# Patient Record
Sex: MALE | Race: WHITE | NOT HISPANIC OR LATINO | Employment: FULL TIME | URBAN - METROPOLITAN AREA
[De-identification: names, ages, dates, MRNs, and addresses within clinical notes are randomized per-mention and may not be internally consistent; named-entity substitution may affect disease eponyms.]

---

## 2020-12-09 ENCOUNTER — OFFICE VISIT (OUTPATIENT)
Dept: URGENT CARE | Facility: CLINIC | Age: 30
End: 2020-12-09
Payer: COMMERCIAL

## 2020-12-09 VITALS
SYSTOLIC BLOOD PRESSURE: 131 MMHG | TEMPERATURE: 98 F | HEART RATE: 62 BPM | HEIGHT: 72 IN | OXYGEN SATURATION: 96 % | WEIGHT: 185 LBS | RESPIRATION RATE: 15 BRPM | DIASTOLIC BLOOD PRESSURE: 75 MMHG | BODY MASS INDEX: 25.06 KG/M2

## 2020-12-09 DIAGNOSIS — U07.1 COVID-19 VIRUS DETECTED: ICD-10-CM

## 2020-12-09 DIAGNOSIS — U07.1 COVID-19: Primary | ICD-10-CM

## 2020-12-09 LAB
CTP QC/QA: YES
SARS-COV-2 RDRP RESP QL NAA+PROBE: POSITIVE

## 2020-12-09 PROCEDURE — 99202 OFFICE O/P NEW SF 15 MIN: CPT | Mod: S$GLB,,, | Performed by: STUDENT IN AN ORGANIZED HEALTH CARE EDUCATION/TRAINING PROGRAM

## 2020-12-09 PROCEDURE — 99202 PR OFFICE/OUTPT VISIT, NEW, LEVL II, 15-29 MIN: ICD-10-PCS | Mod: S$GLB,,, | Performed by: STUDENT IN AN ORGANIZED HEALTH CARE EDUCATION/TRAINING PROGRAM

## 2020-12-09 PROCEDURE — U0002 COVID-19 LAB TEST NON-CDC: HCPCS | Mod: QW,S$GLB,, | Performed by: STUDENT IN AN ORGANIZED HEALTH CARE EDUCATION/TRAINING PROGRAM

## 2020-12-09 PROCEDURE — 3008F BODY MASS INDEX DOCD: CPT | Mod: CPTII,S$GLB,, | Performed by: STUDENT IN AN ORGANIZED HEALTH CARE EDUCATION/TRAINING PROGRAM

## 2020-12-09 PROCEDURE — 3008F PR BODY MASS INDEX (BMI) DOCUMENTED: ICD-10-PCS | Mod: CPTII,S$GLB,, | Performed by: STUDENT IN AN ORGANIZED HEALTH CARE EDUCATION/TRAINING PROGRAM

## 2020-12-09 PROCEDURE — U0002: ICD-10-PCS | Mod: QW,S$GLB,, | Performed by: STUDENT IN AN ORGANIZED HEALTH CARE EDUCATION/TRAINING PROGRAM

## 2020-12-09 NOTE — PATIENT INSTRUCTIONS
Please isolate yourself at home.  You may leave home and/or return to work once the following conditions are met:    If you were not hospitalized and are not severely immunocompromised*:   More than 10 days since symptoms first appeared AND   More than 24 hours fever free without medications AND   Symptoms have improved     If you were hospitalized OR are severely immunocompromised*:   More than 20 days since symptoms first appeared   More than 24 hours fever free without medications   Symptoms have improved    If you had no symptoms but tested positive:   More than 10 days since the date of the first positive test (20 days if severely immunocompromised).   If you develop symptoms, then use the guidelines above.     *Definition of severely immunocompromised:  - Current chemotherapy for cancer  - Untreated HIV with CD4 count less than 200  - Combined primary immunodeficiency disorder  - Prednisone more than 20 mg per day for more than 14 days  - Post-transplant patients    Additional instructions:   Separate yourself from other people and animals in your home.   Call ahead before visiting your doctor.   Wear a facemask when around others.   Cover your coughs and sneezes.   Wash your hands often with soap and water; hand  can be used, too.   Avoid sharing personal household items.   Wipe down surfaces used daily.   Monitor your symptoms. Seek prompt medical attention if your illness is worsening (e.g., difficulty breathing).    Before seeking care, call your healthcare provider.   If you have a medical emergency and need to call 911, notify the dispatch personnel that you have, or are being evaluated for COVID-19. If possible, put on a facemask before emergency medical services arrive.        Contact Tracing    As one of the next steps, you will receive a call or text from the [place] Department of Health ([facility]) COVID-19 Tracing Team. See the contact information below so you know not to  ignore the health departments call. It is important that you contact them back immediately so they can help.      Contact Tracer Number:  [phone]  Caller ID for most carriers: [place] Dept Health     What is contact tracing?  · Contact tracing is a process that helps identify everyone who has been in close contact with an infected person. Contact tracers let those people know they may have been exposed and guide them on next steps. Confidentiality is important for everyone; no one will be told who may have exposed them to the virus.  · Your involvement is important. The more we know about where and how this virus is spreading, the better chance we have at stopping it from spreading further.  What does exposure mean?  · Exposure means you have been within 6 feet for more than 15 minutes with a person who has or had COVID-19.  What kind of questions do the contact tracers ask?  · A contact tracer will confirm your basic contact information including name, address, phone number, and next of kin, as well as asking about any symptoms you may have had. Theyll also ask you how you think you may have gotten sick, such as places where you may have been exposed to the virus, and people you were with. Those names will never be shared with anyone outside of that call, and will only be used to help trace and stop the spread of the virus.   I have privacy concerns. How will the state use my information?  · Your privacy about your health is important. All calls are completed using call centers that use the appropriate health privacy protection measures (HIPAA compliance), meaning that your patient information is safe. No one will ever ask you any questions related to immigration status. Your health comes first.   Do I have to participate?  · You do not have to participate, but we strongly encourage you to. Contact tracing can help us catch and control new outbreaks as theyre developing to keep your friends and family safe.    What if I dont hear from anyone?  · If you dont receive a call within 24 hours, you can call the number above right away to inquire about your status. That line is open from 8:00 am - 8:00 p.m., 7 days a week.  Contact tracing saves lives! Together, we have the power to beat this virus and keep our loved ones and neighbors safe.    For more information see CDC link below.      https://www.cdc.gov/coronavirus/2019-ncov/hcp/guidance-prevent-spread.html#precautions        Sources:  CDC, [place] Department [facility] and [facility]

## 2020-12-09 NOTE — PROGRESS NOTES
Subjective:       Patient ID: Evan Sotelo is a 30 y.o. male.    Vitals:  height is 6' (1.829 m) and weight is 83.9 kg (185 lb). His temperature is 98.3 °F (36.8 °C). His blood pressure is 131/75 and his pulse is 62. His respiration is 15 and oxygen saturation is 96%.     Chief Complaint: COVID-19 Concerns    This is a 30 y.o. male who presents today with a chief complaint of   Sore throat, sneezing, congestion, chills, and headaches. Pt is a . Sx started yesterday. Denied fever or sx's of respiratory distress.    Sore Throat   This is a new problem. The current episode started in the past 7 days. The problem has been gradually worsening. Neither side of throat is experiencing more pain than the other. There has been no fever. The patient is experiencing no pain. Associated symptoms include congestion, coughing and headaches. Pertinent negatives include no abdominal pain, diarrhea, ear discharge, ear pain, plugged ear sensation, neck pain, shortness of breath, stridor, swollen glands, trouble swallowing or vomiting. He has had no exposure to strep or mono. He has tried nothing for the symptoms. The treatment provided no relief.       Constitution: Positive for appetite change and chills. Negative for sweating, fatigue and fever.   HENT: Positive for congestion and sore throat. Negative for ear pain, ear discharge, sinus pain, sinus pressure, trouble swallowing and voice change.         Anosmia   Neck: Negative for neck pain and painful lymph nodes.   Cardiovascular: Negative for chest pain, palpitations and sob on exertion.   Eyes: Negative for eye discharge, eye itching and eye redness.   Respiratory: Positive for cough. Negative for chest tightness, sputum production, bloody sputum, COPD, shortness of breath, stridor, wheezing and asthma.    Gastrointestinal: Positive for nausea. Negative for abdominal pain, vomiting and diarrhea.   Musculoskeletal: Positive for muscle ache. Negative for joint  pain and joint swelling.   Skin: Negative for color change, rash and lesion.   Allergic/Immunologic: Positive for sneezing. Negative for seasonal allergies and asthma.   Neurological: Positive for headaches. Negative for dizziness and light-headedness.   Hematologic/Lymphatic: Negative for swollen lymph nodes.   Psychiatric/Behavioral: Negative for confusion, agitation and sleep disturbance.       Objective:      Physical Exam   Constitutional: He appears well-developed. He does not appear ill. No distress.   HENT:   Head: Normocephalic and atraumatic.   Ears:   Right Ear: External ear normal.   Left Ear: External ear normal.   Eyes: Conjunctivae and EOM are normal. Right eye exhibits no discharge. Left eye exhibits no discharge.   Cardiovascular: Normal rate, regular rhythm and normal heart sounds.   Pulmonary/Chest: Effort normal and breath sounds normal. No respiratory distress.   Neurological: He is alert.   Vitals reviewed.    Recent Lab Results       12/09/20  1407         Acceptable Yes     SARS-CoV-2 RNA, Amplification, Qual Positive             Assessment:       1. COVID-19        Plan:         COVID-19  -     POCT COVID-19 Rapid Screening  - Reviewed positive COVID results with patient. Counseled on home isolation per CDC guidelines. Discussed home isolation and monitoring for close contacts per CDC definition. Return precautions for worsening symptoms given. Questions answered and pt expressed understanding.  - counseled on home care and OTC medications    Follow up if symptoms worsen or fail to improve.    Kendall Moss MD/MPH  [facility]  [facility]

## 2020-12-09 NOTE — LETTER
[address]  [address] ? [place], [address] ? Phone [phone] ? Fax [phone]           Return to Work/School    Patient: Evan Sotelo  YOB: 1990   Date: 12/09/2020      To Whom It May Concern:     Evan Sotelo was in contact with/seen in my office on 12/09/2020. COVID-19 is present in our communities across the [facility]. Not all patients are eligible or appropriate to be tested. In this situation, your employee meets the following criteria:     Evan Sotelo has met the criteria for COVID-19 testing and has a POSITIVE result. He can return to work one his symptoms improve and he is without fever for 24 hours without the use of fever reducing medications AND at least ten days from the start of symptoms (12/18/2020 at the earliest).      If you have any questions or concerns, or if I can be of further assistance, please do not hesitate to contact me.     Sincerely,    Kendall Moss MD

## 2024-07-18 ENCOUNTER — OFFICE VISIT (OUTPATIENT)
Dept: URGENT CARE | Facility: CLINIC | Age: 34
End: 2024-07-18
Payer: COMMERCIAL

## 2024-07-18 VITALS
SYSTOLIC BLOOD PRESSURE: 129 MMHG | HEART RATE: 69 BPM | WEIGHT: 185 LBS | DIASTOLIC BLOOD PRESSURE: 73 MMHG | HEIGHT: 72 IN | BODY MASS INDEX: 25.06 KG/M2 | OXYGEN SATURATION: 98 % | TEMPERATURE: 98 F | RESPIRATION RATE: 18 BRPM

## 2024-07-18 DIAGNOSIS — T63.481A INSECT STINGS, ACCIDENTAL OR UNINTENTIONAL, INITIAL ENCOUNTER: Primary | ICD-10-CM

## 2024-07-18 PROCEDURE — 99203 OFFICE O/P NEW LOW 30 MIN: CPT | Mod: 25,S$GLB,, | Performed by: NURSE PRACTITIONER

## 2024-07-18 PROCEDURE — 96372 THER/PROPH/DIAG INJ SC/IM: CPT | Mod: S$GLB,,, | Performed by: NURSE PRACTITIONER

## 2024-07-18 RX ORDER — HYDROXYZINE PAMOATE 25 MG/1
25 CAPSULE ORAL EVERY 8 HOURS PRN
Qty: 21 CAPSULE | Refills: 0 | Status: SHIPPED | OUTPATIENT
Start: 2024-07-18

## 2024-07-18 RX ORDER — DEXAMETHASONE SODIUM PHOSPHATE 100 MG/10ML
10 INJECTION INTRAMUSCULAR; INTRAVENOUS
Status: COMPLETED | OUTPATIENT
Start: 2024-07-18 | End: 2024-07-18

## 2024-07-18 RX ORDER — TRIAMCINOLONE ACETONIDE 1 MG/G
OINTMENT TOPICAL 2 TIMES DAILY
Qty: 30 G | Refills: 0 | Status: SHIPPED | OUTPATIENT
Start: 2024-07-18 | End: 2024-07-25

## 2024-07-18 RX ADMIN — DEXAMETHASONE SODIUM PHOSPHATE 10 MG: 100 INJECTION INTRAMUSCULAR; INTRAVENOUS at 10:07

## 2024-07-18 NOTE — PATIENT INSTRUCTIONS
- You must understand that you have received an Urgent Care treatment only and that you may be released before all of your medical problems are known or treated.   - You, the patient, will arrange for follow up care as instructed.   - If your condition worsens or fails to improve we recommend that you receive another evaluation at the ER immediately or contact your PCP to discuss your concerns.   - You can call [phone] or [phone] to help schedule an appointment with the appropriate provider.    Keep area clean and dry   Clean the area with soap and water and apply antibiotic ointment twice a day  Monitor the area for signs of infection: redness, swelling, increasing pain, yellow drainage, warmth  Apply ice for about 15 minutes, do not place directly on skin. Do this 3-4 times per day

## 2024-07-18 NOTE — PROGRESS NOTES
Subjective:      Patient ID: Evan Sotelo is a 33 y.o. male.    Vitals:  height is 6' (1.829 m) and weight is 83.9 kg (185 lb). His oral temperature is 97.9 °F (36.6 °C). His blood pressure is 129/73 and his pulse is 69. His respiration is 18 and oxygen saturation is 98%.     Chief Complaint: Insect Bite    Pt is a 33 y.o. male with the complaint of right arm swelling after wasp sting. Symptoms began 3 days ago and swelling began the following day. He has been putting ice on the sting. This morning it has worsened, swollen from wrist to shoulder.     Insect Bite  This is a new problem. The current episode started in the past 7 days. The problem has been gradually worsening. Associated symptoms include numbness. Pertinent negatives include no abdominal pain, anorexia, arthralgias, change in bowel habit, chest pain, chills, congestion, coughing, diaphoresis, fatigue, fever, headaches, joint swelling, myalgias, nausea, neck pain, rash, sore throat, swollen glands, urinary symptoms, vertigo, visual change, vomiting or weakness. Nothing aggravates the symptoms. He has tried nothing for the symptoms.       Constitution: Negative for chills, sweating, fatigue and fever.   HENT:  Negative for congestion and sore throat.    Neck: Negative for neck pain.   Cardiovascular:  Negative for chest pain.   Respiratory:  Negative for cough.    Gastrointestinal:  Negative for abdominal pain, nausea and vomiting.   Musculoskeletal:  Negative for joint pain, joint swelling and muscle ache.   Skin:  Negative for rash.   Neurological:  Positive for numbness. Negative for history of vertigo and headaches.      Objective:     Physical Exam   Constitutional: He is oriented to person, place, and time.   HENT:   Head: Normocephalic.   Ears:   Right Ear: External ear normal.   Left Ear: External ear normal.   Nose: Nose normal.   Mouth/Throat: Mucous membranes are moist.   Eyes: Conjunctivae are normal.   Cardiovascular: Normal rate.    Pulmonary/Chest: Effort normal.   Musculoskeletal: Normal range of motion.         General: Normal range of motion.   Neurological: He is alert and oriented to person, place, and time.   Skin: Skin is dry.        Psychiatric: His behavior is normal.   Nursing note and vitals reviewed.      Assessment:     1. Insect stings, accidental or unintentional, initial encounter        Plan:       Insect stings, accidental or unintentional, initial encounter  -     dexAMETHasone injection 10 mg  -     hydrOXYzine pamoate (VISTARIL) 25 MG Cap; Take 1 capsule (25 mg total) by mouth every 8 (eight) hours as needed (itching).  Dispense: 21 capsule; Refill: 0  -     triamcinolone acetonide 0.1% (KENALOG) 0.1 % ointment; Apply topically 2 (two) times daily. for 7 days  Dispense: 30 g; Refill: 0      Patient Instructions   - You must understand that you have received an Urgent Care treatment only and that you may be released before all of your medical problems are known or treated.   - You, the patient, will arrange for follow up care as instructed.   - If your condition worsens or fails to improve we recommend that you receive another evaluation at the ER immediately or contact your PCP to discuss your concerns.   - You can call [phone] or [phone] to help schedule an appointment with the appropriate provider.    Keep area clean and dry   Clean the area with soap and water and apply antibiotic ointment twice a day  Monitor the area for signs of infection: redness, swelling, increasing pain, yellow drainage, warmth  Apply ice for about 15 minutes, do not place directly on skin. Do this 3-4 times per day               no

## 2024-10-22 ENCOUNTER — OFFICE VISIT (OUTPATIENT)
Dept: PRIMARY CARE CLINIC | Facility: CLINIC | Age: 34
End: 2024-10-22
Payer: COMMERCIAL

## 2024-10-22 DIAGNOSIS — R42 DIZZINESS: ICD-10-CM

## 2024-10-22 DIAGNOSIS — L30.1 DYSHIDROSIS: ICD-10-CM

## 2024-10-22 DIAGNOSIS — R42 DISEQUILIBRIUM: Primary | ICD-10-CM

## 2024-10-22 PROCEDURE — 99213 OFFICE O/P EST LOW 20 MIN: CPT | Mod: 95,,, | Performed by: FAMILY MEDICINE

## 2024-10-22 RX ORDER — MECLIZINE HYDROCHLORIDE 25 MG/1
25 TABLET ORAL 3 TIMES DAILY PRN
Qty: 30 TABLET | Refills: 2 | Status: SHIPPED | OUTPATIENT
Start: 2024-10-22

## 2024-10-22 RX ORDER — CLOTRIMAZOLE AND BETAMETHASONE DIPROPIONATE 10; .64 MG/G; MG/G
CREAM TOPICAL 2 TIMES DAILY
Qty: 45 G | Refills: 1 | Status: SHIPPED | OUTPATIENT
Start: 2024-10-22

## 2024-10-22 NOTE — PROGRESS NOTES
Subjective:       Patient ID: Evan Sotelo is a 34 y.o. male.    Chief Complaint: No chief complaint on file.    HPI:Virtual Video Telehealth Visit     The patient location is:  Home  The chief complaint leading to consultation is:  Dizziness disequilibrium dyshidrosis  Visit type: Virtual visit  Total time spent with patient:  20 minutes     Each patient to whom I provide medical services by telemedicine is:  (1) informed of the relationship between the physician and patient and the respective role of any other health care provider with respect to management of the patient; and (2) notified that they may decline to receive medical services by telemedicine and may withdraw from such care at any time. Patient verbally consented to receive this service via voice-only telephone call.       HPI:  See history of present illness above     Assessment and plan:  See plan below 33 yo WM --disequilibrium--when patient either turns head to the right or bends over and then comes up has difficulty reestablishing his balance---x1 week.-no prior incident.  Had fever 1 month ago---no runny nose--no sore throat--no cough  Dizziness as well as disequilibrium---no loss of consciousness or seizures--1st time this occurred patient had to sit down and body temperature felt cold.  Eczema patient is  wears boots---dyshidrosis    ROS:  Skin: no psoriasis, +eczema, skin cancer  HEENT: No headache, ocular pain, blurred vision, diplopia, epistaxis, hoarseness change in voice, thyroid trouble  Lung: No pneumonia, asthma, Tb, wheezing, SOB, no smoking  Heart: No chest pain, ankle edema, palpitations, MI, torrey murmur, hypertension, hyperlipidemia--  Abdomen: No nausea, vomiting, diarrhea, constipation, ulcers, hepatitis, gallbladder disease, melena, hematochezia, hematemesis  : no UTI, renal disease, stones  MS: no fractures, O/A, lupus, rheumatoid, gout  Neuro: No dizziness, LOC, seizures   No diabetes, no anemia, no  anxiety, no depression   [employer] 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 Disequilibrium    2. Dizziness    3. Dyshidrosis        Plan:       Disequilibrium    Dizziness  -     CBC Auto Differential; Future; Expected date: 10/22/2024  -     EKG 12-lead; Future  -     Comprehensive Metabolic Panel; Future; Expected date: 10/22/2024  -     Lipid Panel; Future; Expected date: 10/22/2024  -     X-Ray Chest PA And Lateral; Future; Expected date: 10/22/2024  -     T4, Free; Future; Expected date: 10/22/2024  -     TSH; Future; Expected date: 10/22/2024    Dyshidrosis    Other orders  -     clotrimazole-betamethasone 1-0.05% (LOTRISONE) cream; Apply topically 2 (two) times daily.  Dispense: 45 g; Refill: 1  -     meclizine (ANTIVERT) 25 mg tablet; Take 1 tablet (25 mg total) by mouth 3 (three) times daily as needed.  Dispense: 30 tablet; Refill: 2        Main Reason for Visit  Disequilibrium or dizziness--times 3-4 days---no history of trauma--no infections---occurs mainly with positioning of the head either tilting real far to the side or bend over and stands up quickly---will try Antivert 25 mg t.i.d.---lab--CBCs CMP lipids T4 TSH chest x-ray EKG-  If patient fails to improve in 2 weeks will consider CT scan or MRI of the brain/carotid ultrasound/24 hour Holter/echocardiogram--call sooner if symptoms get worse  Dyshidrosis--patient wears boots all day  as a --has scaliness on feet from eczema--will treat with Lotrisone cream b.i.d. p.r.n.  Lab see above  Return 6 weeks

## 2024-10-28 ENCOUNTER — TELEPHONE (OUTPATIENT)
Dept: OTOLARYNGOLOGY | Facility: CLINIC | Age: 34
End: 2024-10-28
Payer: COMMERCIAL

## 2024-11-05 ENCOUNTER — TELEPHONE (OUTPATIENT)
Dept: PRIMARY CARE CLINIC | Facility: CLINIC | Age: 34
End: 2024-11-05
Payer: COMMERCIAL

## 2024-11-05 NOTE — TELEPHONE ENCOUNTER
----- Message from Jose Blanca MD sent at 10/25/2024 11:50 PM CDT -----  Call tell chest Xray WNL no new tx